# Patient Record
(demographics unavailable — no encounter records)

---

## 2024-10-15 NOTE — REASON FOR VISIT
[Symptom and Test Evaluation] : symptom and test evaluation [FreeTextEntry1] : 73M comes in for a re-evaluation. He completed an echocardiogram and a CCTA. We are discussing results. His blood pressure elevated on arrival.

## 2024-10-15 NOTE — DISCUSSION/SUMMARY
[FreeTextEntry1] : HTN cont same medications for now HLD mild given mild CAD, a statin can be considered, he wants to hold off for now CAD cont ASA

## 2024-11-06 NOTE — HISTORY OF PRESENT ILLNESS
[FreeTextEntry1] : Dear Dr. Huitron,  Thank you so much for the referral to help care for your patient.  Chief Complaint: Elevated PSA Date of first visit: 7/20/2023  CHRISTIN BETANCOURT is a 73 year old  man with PMHx HTN who presents for elevated PSA. His PSA has slowly been rising since 2017. It has ranged 2.6 ng/ml to most recently 4.46 ng/ml. MRI 7/26/23 was read as negative. He is biopsy naive. Does have a + family hx of CaP in his paternal uncle and his brother. Brother was diagnosed around age 60 when his PSA was >8 ng/ml and underwent brachytherapy. Unsure of [place] score. Brother is now 80 with no recurrence.  He is experiencing moderate LUTS including nocturia and urgency. His flow varies in strength. He is minimally bothered by his symptoms and not interested in alpha blocker.  PSA Hx 4.01 03/22/2024 4.46 7/5/23 3.81 6/30/22 2.73 3/23/21 2.73 2/7/20 2.88 11/9/18 2.63 4/6/17  MRI Hx: MRI read 07/26/2023 at [facility]. 47.6 cc prostate with PIRADS 2 no MRI targetable lesions. No LAD No EPE, No Bony Lesions. The images have been reviewed and clinical implications discussed with the patient. On review, image #15 left base pzpm area to watch  11/06/2024 IPSS 17   QOL 4 JACOB  PVR 64 cc   09/11/2023 IPSS QOL - questionnaire not provided JACOB 5 -NSA  07/20/2023 IPSS 10 QOL 2 IIEF-5 score - would prefer to not fill out but reports "no ED"  Prostate cancer screening: the patient and I spoke at length about prostate cancer screening, its risks and its benefits. The patient has 3 (age, FH, PSA) risk factors for prostate cancer. He understands that many men with prostate cancer will die with the disease rather than of it and we also discussed the results large multi-center American and  prostate cancer screening trials. He also understands that PSA in and of itself does not diagnose prostate cancer but only assesses risk to a certain degree. The patient understands that to definitively screen for prostate cancer, a biopsy is required and this procedure has risks, including bleeding, infection, ED and urinary retention. The patient opted to follow with PSA and imaging. He has declined [facility]  The patient denies fevers, chills, nausea and or vomiting and no unexplained weight loss.  All pertinent laboratory, films and physician notes were reviewed. Questionnaire results were discussed with patient.  I spent 31 minutes of non-face to face time reviewing the patient's records, chart, uploading processing MR images, transferring MR images from PACS to an independent workstation, reviewing images on independent workstation, speaking with their physician and planning their prostate biopsy and preparation of an upcoming visit for 09/11/2023 . The imaging and planning was highly complex and took this entire time.

## 2024-11-06 NOTE — ASSESSMENT
[FreeTextEntry1] : 74 y/o man with rising PSA over the years, most recently 4.46 ng/ml. MRI July 2023 read as negative, no prior biopsy. Does have a + family hx of CaP in his paternal uncle and brother. Negative AD. Moderate LUTS not on medication.  The prostate MRI has been reviewed with the patient (images and report). There are no suspicious lesions on 2nd look. Reviewing multiple studies, the probability of having prostate cancer with a negative prostate MRI is ~ 20%. However, the probability of having clinically significant disease is <5% of those positive. A recent study from [facility] presented level 1b evidence that a MRI can help avoid patient's biopsies and only misses ~ 3% of clinically significant disease.  I have discussed these details at length with the patient. He is aware of the pro's and con's of prostate cancer screening. Taking into account his PSA, Family History and AD findings. He wishes at this time to avoid a biopsy and will continue to undergo PSA based screening. If the PSA continues to increase or there is increasing clinical suspicion we will repeat MR imaging of the prostate prior to any intervention, due to risks associated with the prostate biopsy. The patient understands that a prostate biopsy is still the standard of care with regards to screening and MRI is an adjunct that can help localize and target more suspicious areas. In conclusion, he would like to hold off on the biopsy at this time.  elevated psa  - MRI at [facility] reviewed 9/11/23- image #15 left base pzpm area to watch. Reimage in 3-5 years, pending PSA trend ([id number]) - [facility] was offered and declined by patient - discussed psa may also be elevated due to voiding dysfunction - PSA q6 months, check today 11/6/24  BPH with LUTS - IPSS 17, recommended alfuzosin trial, rx sent   The patient understands that this medication may cause dizziness, retrograde ejaculation or nasal congestion among other complications. I recommended that the patient take this medication at night. If the side effects become too bothersome, the medication can be discontinued.   Follow up in 6 months  Thank you very much for allowing me to assist in the care of this patient. Please do not hesitate to contact me with any additional questions or concerns.     Sincerely,     Shan Brenner D.O. Professor of Urology and Radiology  of Urology at [facility] Director for Prostate Cancer [address] [place] [address], 5th Floor [facility], [address] Phone: [phone]   I was present with the Resident (Bayron Spann MD) during the irving portions of the history and exam. I discussed the case with the Resident and agree with the findings and plan as documented in the Resident/Fellow 's note, unless noted below.

## 2024-11-06 NOTE — ASSESSMENT
[FreeTextEntry1] : 72 y/o man with rising PSA over the years, most recently 4.46 ng/ml. MRI July 2023 read as negative, no prior biopsy. Does have a + family hx of CaP in his paternal uncle and brother. Negative AD. Moderate LUTS not on medication.  The prostate MRI has been reviewed with the patient (images and report). There are no suspicious lesions on 2nd look. Reviewing multiple studies, the probability of having prostate cancer with a negative prostate MRI is ~ 20%. However, the probability of having clinically significant disease is <5% of those positive. A recent study from [facility] presented level 1b evidence that a MRI can help avoid patient's biopsies and only misses ~ 3% of clinically significant disease.  I have discussed these details at length with the patient. He is aware of the pro's and con's of prostate cancer screening. Taking into account his PSA, Family History and AD findings. He wishes at this time to avoid a biopsy and will continue to undergo PSA based screening. If the PSA continues to increase or there is increasing clinical suspicion we will repeat MR imaging of the prostate prior to any intervention, due to risks associated with the prostate biopsy. The patient understands that a prostate biopsy is still the standard of care with regards to screening and MRI is an adjunct that can help localize and target more suspicious areas. In conclusion, he would like to hold off on the biopsy at this time.  elevated psa  - MRI at [facility] reviewed 9/11/23- image #15 left base pzpm area to watch. Reimage in 3-5 years, pending PSA trend ([id number]) - [facility] was offered and declined by patient - discussed psa may also be elevated due to voiding dysfunction - PSA q6 months, check today 11/6/24  BPH with LUTS - IPSS 17, recommended alfuzosin trial, rx sent   The patient understands that this medication may cause dizziness, retrograde ejaculation or nasal congestion among other complications. I recommended that the patient take this medication at night. If the side effects become too bothersome, the medication can be discontinued.   Follow up in 6 months  Thank you very much for allowing me to assist in the care of this patient. Please do not hesitate to contact me with any additional questions or concerns.     Sincerely,     Shan Brenner D.O. Professor of Urology and Radiology  of Urology at [facility] Director for Prostate Cancer [address] [place] [address], 5th Floor [facility], [address] Phone: [phone]   I was present with the Resident (Bayron Spann MD) during the irving portions of the history and exam. I discussed the case with the Resident and agree with the findings and plan as documented in the Resident/Fellow 's note, unless noted below.

## 2024-11-06 NOTE — HISTORY OF PRESENT ILLNESS
[FreeTextEntry1] : Dear Dr. Huitron,  Thank you so much for the referral to help care for your patient.  Chief Complaint: Elevated PSA Date of first visit: 7/20/2023  CHRISTIN BETANCOURT is a 73 year old  man with PMHx HTN who presents for elevated PSA. His PSA has slowly been rising since 2017. It has ranged 2.6 ng/ml to most recently 4.46 ng/ml. MRI 7/26/23 was read as negative. He is biopsy naive. Does have a + family hx of CaP in his paternal uncle and his brother. Brother was diagnosed around age 60 when his PSA was >8 ng/ml and underwent brachytherapy. Unsure of [place] score. Brother is now 80 with no recurrence.  He is experiencing moderate LUTS including nocturia and urgency. His flow varies in strength. He is minimally bothered by his symptoms and not interested in alpha blocker.  PSA Hx 4.01 03/22/2024 4.46 7/5/23 3.81 6/30/22 2.73 3/23/21 2.73 2/7/20 2.88 11/9/18 2.63 4/6/17  MRI Hx: MRI read 07/26/2023 at [facility]. 47.6 cc prostate with PIRADS 2 no MRI targetable lesions. No LAD No EPE, No Bony Lesions. The images have been reviewed and clinical implications discussed with the patient. On review, image #15 left base pzpm area to watch  11/06/2024 IPSS 17   QOL 4 JACOB  PVR 64 cc   09/11/2023 IPSS QOL - questionnaire not provided JACOB 5 -NSA  07/20/2023 IPSS 10 QOL 2 IIEF-5 score - would prefer to not fill out but reports "no ED"  Prostate cancer screening: the patient and I spoke at length about prostate cancer screening, its risks and its benefits. The patient has 3 (age, FH, PSA) risk factors for prostate cancer. He understands that many men with prostate cancer will die with the disease rather than of it and we also discussed the results large multi-center American and  prostate cancer screening trials. He also understands that PSA in and of itself does not diagnose prostate cancer but only assesses risk to a certain degree. The patient understands that to definitively screen for prostate cancer, a biopsy is required and this procedure has risks, including bleeding, infection, ED and urinary retention. The patient opted to follow with PSA and imaging. He has declined [facility]  The patient denies fevers, chills, nausea and or vomiting and no unexplained weight loss.  All pertinent laboratory, films and physician notes were reviewed. Questionnaire results were discussed with patient.  I spent 31 minutes of non-face to face time reviewing the patient's records, chart, uploading processing MR images, transferring MR images from PACS to an independent workstation, reviewing images on independent workstation, speaking with their physician and planning their prostate biopsy and preparation of an upcoming visit for 09/11/2023 . The imaging and planning was highly complex and took this entire time.

## 2025-04-22 NOTE — HISTORY OF PRESENT ILLNESS
[FreeTextEntry1] : Dear Dr. Huitron,  Thank you so much for the referral to help care for your patient.  Chief Complaint: Elevated PSA Date of first visit: 7/20/2023  CHRISTIN BETANCOURT is a 73 year old  man with PMHx HTN who presents for elevated PSA. His PSA has slowly been rising since 2017. It has ranged 2.6 ng/ml to most recently 4.46 ng/ml. MRI 7/26/23 was read as negative. He is biopsy naive. Does have a + family hx of CaP in his paternal uncle and his brother. Brother was diagnosed around age 60 when his PSA was >8 ng/ml and underwent brachytherapy. Unsure of [employer] score. Brother is now 80 with no recurrence.  He is experiencing moderate LUTS including nocturia and urgency. His flow varies in strength. He is minimally bothered by his symptoms and not interested in alpha blocker.  PSA Hx 4.27 11/06/2024 4.01 03/22/2024 4.46 7/5/23 3.81 6/30/22 2.73 3/23/21 2.73 2/7/20 2.88 11/9/18 2.63 4/6/17  MRI Hx: MRI read 07/26/2023 at [facility]. 47.6 cc prostate with PIRADS 2 no MRI targetable lesions. No LAD No EPE, No Bony Lesions. The images have been reviewed and clinical implications discussed with the patient. On review, image #15 left base pzpm area to watch  05/07/2025  IPSS  QOL JACOB  PVR  11/06/2024 IPSS 17 QOL 4 JACOB PVR 64 cc  09/11/2023 IPSS QOL - questionnaire not provided JACOB 5 -NSA  07/20/2023 IPSS 10 QOL 2 IIEF-5 score - would prefer to not fill out but reports "no ED"  Prostate cancer screening: the patient and I spoke at length about prostate cancer screening, its risks and its benefits. The patient has 3 (age, FH, PSA) risk factors for prostate cancer. He understands that many men with prostate cancer will die with the disease rather than of it and we also discussed the results large multi-center American and  prostate cancer screening trials. He also understands that PSA in and of itself does not diagnose prostate cancer but only assesses risk to a certain degree. The patient understands that to definitively screen for prostate cancer, a biopsy is required and this procedure has risks, including bleeding, infection, ED and urinary retention. The patient opted to follow with PSA and imaging. He has declined [facility]  The patient denies fevers, chills, nausea and or vomiting and no unexplained weight loss.  All pertinent laboratory, films and physician notes were reviewed. Questionnaire results were discussed with patient.  I spent 31 minutes of non-face to face time reviewing the patient's records, chart, uploading processing MR images, transferring MR images from PACS to an independent workstation, reviewing images on independent workstation, speaking with their physician and planning their prostate biopsy and preparation of an upcoming visit for 09/11/2023 . The imaging and planning was highly complex and took this entire time.

## 2025-04-22 NOTE — ASSESSMENT
[FreeTextEntry1] : 72 y/o man with rising PSA over the years, most recently 4.46 ng/ml. MRI July 2023 read as negative, no prior biopsy. Does have a + family hx of CaP in his paternal uncle and brother. Negative AD. Moderate LUTS not on medication.  The prostate MRI has been reviewed with the patient (images and report). There are no suspicious lesions on 2nd look. Reviewing multiple studies, the probability of having prostate cancer with a negative prostate MRI is ~ 20%. However, the probability of having clinically significant disease is <5% of those positive. A recent study from [facility] presented level 1b evidence that a MRI can help avoid patient's biopsies and only misses ~ 3% of clinically significant disease.  I have discussed these details at length with the patient. He is aware of the pro's and con's of prostate cancer screening. Taking into account his PSA, Family History and AD findings. He wishes at this time to avoid a biopsy and will continue to undergo PSA based screening. If the PSA continues to increase or there is increasing clinical suspicion we will repeat MR imaging of the prostate prior to any intervention, due to risks associated with the prostate biopsy. The patient understands that a prostate biopsy is still the standard of care with regards to screening and MRI is an adjunct that can help localize and target more suspicious areas. In conclusion, he would like to hold off on the biopsy at this time.  elevated psa - MRI at [facility] reviewed 9/11/23- image #15 left base pzpm area to watch. Reimage in 3-5 years, pending PSA trend ([id number]) - [facility] was offered and declined by patient - discussed psa may also be elevated due to voiding dysfunction - PSA q6 months, check today 11/6/24  BPH with LUTS - IPSS 17, recommended alfuzosin trial, rx sent  The patient understands that this medication may cause dizziness, retrograde ejaculation or nasal congestion among other complications. I recommended that the patient take this medication at night. If the side effects become too bothersome, the medication can be discontinued.  Follow up in 6 months  Thank you very much for allowing me to assist in the care of this patient. Please do not hesitate to contact me with any additional questions or concerns.   Sincerely,

## 2025-05-07 NOTE — HISTORY OF PRESENT ILLNESS
[FreeTextEntry1] : Dear Dr. Huitron,  Thank you so much for the referral to help care for your patient.  Chief Complaint: Elevated PSA Date of first visit: 7/20/2023  CHRISTIN BETANCOURT is a 73 year old  man with PMHx HTN who presents for elevated PSA. His PSA has slowly been rising since 2017. It has ranged 2.6 ng/ml to most recently 4.46 ng/ml. MRI 7/26/23 was read as negative. He is biopsy naive. Does have a + family hx of CaP in his paternal uncle and his brother. Brother was diagnosed around age 60 when his PSA was >8 ng/ml and underwent brachytherapy. Unsure of [place] score. Brother is now 80 with no recurrence.  He is experiencing moderate LUTS including nocturia and urgency. His flow varies in strength. He is minimally bothered by his symptoms, had alfuzosin trial but discontinued due to severe dizziness.   PSA Hx 4.27 11/06/2024, PSAD 0.08 ng/ml/cc  4.01 03/22/2024 4.46 7/5/23 3.81 6/30/22 2.73 3/23/21 2.73 2/7/20 2.88 11/9/18 2.63 4/6/17  MRI Hx: MRI read 07/26/2023 at [facility]. 47.6 cc prostate with PIRADS 2 no MRI targetable lesions. No LAD No EPE, No Bony Lesions. The images have been reviewed and clinical implications discussed with the patient. On review, image #15 left base pzpm area to watch  05/07/2025  IPSS 16 QOL 3 JACOB  PVR 53cc   11/06/2024 IPSS 17 QOL 4 JACOB PVR 64 cc  09/11/2023 IPSS QOL - questionnaire not provided JACOB 5 -NSA  07/20/2023 IPSS 10 QOL 2 IIEF-5 score - would prefer to not fill out but reports "no ED"  Prostate cancer screening: the patient and I spoke at length about prostate cancer screening, its risks and its benefits. The patient has 3 (age, FH, PSA) risk factors for prostate cancer. He understands that many men with prostate cancer will die with the disease rather than of it and we also discussed the results large multi-center American and  prostate cancer screening trials. He also understands that PSA in and of itself does not diagnose prostate cancer but only assesses risk to a certain degree. The patient understands that to definitively screen for prostate cancer, a biopsy is required and this procedure has risks, including bleeding, infection, ED and urinary retention. The patient opted to follow with PSA and imaging. He has declined [facility]  The patient denies fevers, chills, nausea and or vomiting and no unexplained weight loss.  All pertinent laboratory, films and physician notes were reviewed. Questionnaire results were discussed with patient.  I spent 31 minutes of non-face to face time reviewing the patient's records, chart, uploading processing MR images, transferring MR images from PACS to an independent workstation, reviewing images on independent workstation, speaking with their physician and planning their prostate biopsy and preparation of an upcoming visit for 09/11/2023 . The imaging and planning was highly complex and took this entire time.

## 2025-05-07 NOTE — ASSESSMENT
[FreeTextEntry1] : 74 y/o man with rising PSA over the years, most recently 4.46 ng/ml. MRI July 2023 read as negative, no prior biopsy. Does have a + family hx of CaP in his paternal uncle and brother. Negative AD. Moderate LUTS not on medication.  elevated psa - MRI at [facility] reviewed 9/11/23- image #15 left base pzpm area to watch. Reimage in 3-5 years, pending PSA trend ([id number]) - [facility] was offered and declined by patient / holding on biopsy at this time as per last note. - discussed psa may also be elevated due to voiding dysfunction - PSA q6 months, check 5/7/25  BPH with LUTS - IPSS 16, PVR 53 cc (5/7/25) tried alfuzosin however discontinued due to severe dizziness - discussed alternative BPH therapies including Rezum, Mini TURP vs Holep - states he is not too bothered by symptoms at this time  Follow up in 6 months with NP MG and 12 months with Dr Calzada   Thank you very much for allowing me to assist in the care of this patient. Please do not hesitate to contact me with any additional questions or concerns.  Sincerely,  Shan Brenner D.O. Professor of Urology and Radiology  of Urology at [facility] Director for Prostate Cancer [address], [address] [place] [address] Phone: [phone].

## 2025-05-07 NOTE — ASSESSMENT
[FreeTextEntry1] : 74 y/o man with rising PSA over the years, most recently 4.46 ng/ml. MRI July 2023 read as negative, no prior biopsy. Does have a + family hx of CaP in his paternal uncle and brother. Negative AD. Moderate LUTS not on medication.  elevated psa - MRI at [facility] reviewed 9/11/23- image #15 left base pzpm area to watch. Reimage in 3-5 years, pending PSA trend ([id number]) - [facility] was offered and declined by patient / holding on biopsy at this time as per last note. - discussed psa may also be elevated due to voiding dysfunction - PSA q6 months, check 5/7/25  BPH with LUTS - IPSS 16, PVR 53 cc (5/7/25) tried alfuzosin however discontinued due to severe dizziness - discussed alternative BPH therapies including Rezum, Mini TURP vs Holep - states he is not too bothered by symptoms at this time  Follow up in 6 months with NP MG and 12 months with Dr Calzada   Thank you very much for allowing me to assist in the care of this patient. Please do not hesitate to contact me with any additional questions or concerns.  Sincerely,  Shan Brenner D.O. Professor of Urology and Radiology  of Urology at [facility] Director for Prostate Cancer [address], [address] [place] [address] Phone: [phone].

## 2025-05-07 NOTE — ADDENDUM
[FreeTextEntry1] :   I, Dr. Brenner, personally performed the evaluation and management (E/M) services for this established patient who presents today with (a) new problem(s)/exacerbation of (an) existing condition(s).  That E/M includes conducting the examination, assessing all new/exacerbated conditions, and establishing a new plan of care.  Today, my ACP, Carolina Avitia, ANP-BC, was here to observe my evaluation and management services for this new problem/exacerbated condition to be followed going forward.

## 2025-05-07 NOTE — HISTORY OF PRESENT ILLNESS
[FreeTextEntry1] : Dear Dr. Huitron,  Thank you so much for the referral to help care for your patient.  Chief Complaint: Elevated PSA Date of first visit: 7/20/2023  CHRISTIN BETANCOURT is a 73 year old  man with PMHx HTN who presents for elevated PSA. His PSA has slowly been rising since 2017. It has ranged 2.6 ng/ml to most recently 4.46 ng/ml. MRI 7/26/23 was read as negative. He is biopsy naive. Does have a + family hx of CaP in his paternal uncle and his brother. Brother was diagnosed around age 60 when his PSA was >8 ng/ml and underwent brachytherapy. Unsure of [place] score. Brother is now 80 with no recurrence.  He is experiencing moderate LUTS including nocturia and urgency. His flow varies in strength. He is minimally bothered by his symptoms, had alfuzosin trial but discontinued due to severe dizziness.   PSA Hx 4.27 11/06/2024, PSAD 0.08 ng/ml/cc  4.01 03/22/2024 4.46 7/5/23 3.81 6/30/22 2.73 3/23/21 2.73 2/7/20 2.88 11/9/18 2.63 4/6/17  MRI Hx: MRI read 07/26/2023 at [facility]. 47.6 cc prostate with PIRADS 2 no MRI targetable lesions. No LAD No EPE, No Bony Lesions. The images have been reviewed and clinical implications discussed with the patient. On review, image #15 left base pzpm area to watch  05/07/2025  IPSS 16 QOL 3 JACOB  PVR 53cc   11/06/2024 IPSS 17 QOL 4 JACOB PVR 64 cc  09/11/2023 IPSS QOL - questionnaire not provided JACOB 5 -NSA  07/20/2023 IPSS 10 QOL 2 IIEF-5 score - would prefer to not fill out but reports "no ED"  Prostate cancer screening: the patient and I spoke at length about prostate cancer screening, its risks and its benefits. The patient has 3 (age, FH, PSA) risk factors for prostate cancer. He understands that many men with prostate cancer will die with the disease rather than of it and we also discussed the results large multi-center [place] and  prostate cancer screening trials. He also understands that PSA in and of itself does not diagnose prostate cancer but only assesses risk to a certain degree. The patient understands that to definitively screen for prostate cancer, a biopsy is required and this procedure has risks, including bleeding, infection, ED and urinary retention. The patient opted to follow with PSA and imaging. He has declined [facility]  The patient denies fevers, chills, nausea and or vomiting and no unexplained weight loss.  All pertinent laboratory, films and physician notes were reviewed. Questionnaire results were discussed with patient.  I spent 31 minutes of non-face to face time reviewing the patient's records, chart, uploading processing MR images, transferring MR images from PACS to an independent workstation, reviewing images on independent workstation, speaking with their physician and planning their prostate biopsy and preparation of an upcoming visit for 09/11/2023 . The imaging and planning was highly complex and took this entire time.

## 2025-07-10 NOTE — DISCUSSION/SUMMARY
[FreeTextEntry1] : Mild CAD cont ASA check echo if able to approve and schedule. HTN - CHRISTIN  and I had an extensive discussion regarding his blood pressure management. Patient will continue taking current medications in addition to maintaining a low Na diet, with periodic b/p checks at home. HLD follow up with PMD labs reviewed.  EKG section of the chart --- secondary to symptoms above an electrocardiogram also known as an EKG was performed.  Risks and benefits discussed with the patient. Patient was given time and privacy to changed into a gown. Shortly after, standard 10 leads were applied and a [employer] system was used to perform the study. The results were subsequently reviewed by attending physician and discussed with the patient. The study showed a normal sinus rhythm and no ST-T suggestive of ischemia. Order for the EKG was placed in the chart. The results were documented. Billing submitted.  [EKG obtained to assist in diagnosis and management of assessed problem(s)] : EKG obtained to assist in diagnosis and management of assessed problem(s)

## 2025-07-10 NOTE — REASON FOR VISIT
[Symptom and Test Evaluation] : symptom and test evaluation [FreeTextEntry1] : 74M comes in for a re-evaluation of mild CAD. No chest pain noted. He remains very active. No recent changes in medications.  No recent testing.

## 2025-07-29 NOTE — PHYSICAL EXAM
[No Acute Distress] : no acute distress [Well Nourished] : well nourished [Well Developed] : well developed [Well-Appearing] : well-appearing [Normal Sclera/Conjunctiva] : normal sclera/conjunctiva [PERRL] : pupils equal round and reactive to light [EOMI] : extraocular movements intact [Normal Outer Ear/Nose] : the outer ears and nose were normal in appearance [Normal Oropharynx] : the oropharynx was normal [No JVD] : no jugular venous distention [No Lymphadenopathy] : no lymphadenopathy [Supple] : supple [Thyroid Normal, No Nodules] : the thyroid was normal and there were no nodules present [No Respiratory Distress] : no respiratory distress  [No Accessory Muscle Use] : no accessory muscle use [Clear to Auscultation] : lungs were clear to auscultation bilaterally [Normal Rate] : normal rate  [Regular Rhythm] : with a regular rhythm [Normal S1, S2] : normal S1 and S2 [No Murmur] : no murmur heard [Normal] : normal rate, regular rhythm, normal S1 and S2 and no murmur heard [No Carotid Bruits] : no carotid bruits [No Edema] : there was no peripheral edema [Normal Appearance] : normal in appearance [Soft] : abdomen soft [Non Tender] : non-tender [Non-distended] : non-distended [No Masses] : no abdominal mass palpated [No HSM] : no HSM [Normal Bowel Sounds] : normal bowel sounds [No CVA Tenderness] : no CVA  tenderness [No Spinal Tenderness] : no spinal tenderness [No Joint Swelling] : no joint swelling [Grossly Normal Strength/Tone] : grossly normal strength/tone [No Rash] : no rash [Coordination Grossly Intact] : coordination grossly intact [No Focal Deficits] : no focal deficits [Normal Gait] : normal gait [Deep Tendon Reflexes (DTR)] : deep tendon reflexes were 2+ and symmetric [Normal Affect] : the affect was normal [Normal Insight/Judgement] : insight and judgment were intact [de-identified] : Slightly increased normal pitched bowel sounds.  No distention, tenderness, guarding, rebound, or masses.

## 2025-07-29 NOTE — HEALTH RISK ASSESSMENT
[Good] : ~his/her~  mood as  good [Yes] : Yes [Monthly or less (1 pt)] : Monthly or less (1 point) [1 or 2 (0 pts)] : 1 or 2 (0 points) [Never (0 pts)] : Never (0 points) [No] : In the past 12 months have you used drugs other than those required for medical reasons? No [No falls in past year] : Patient reported no falls in the past year [Little interest or pleasure doing things] : 1) Little interest or pleasure doing things [0] : 1) Little interest or pleasure doing things: Not at all (0) [Feeling down, depressed, or hopeless] : 2) Feeling down, depressed, or hopeless [1] : 2) Feeling down, depressed, or hopeless for several days (1) [PHQ-2 Negative - No further assessment needed] : PHQ-2 Negative - No further assessment needed [Audit-CScore] : 1 [[id number]] : 1 [Never] : Never [NO] : No [Patient reported colonoscopy was normal] : Patient reported colonoscopy was normal [HIV test declined] : HIV test declined [Hepatitis C test declined] : Hepatitis C test declined [Change in mental status noted] : No change in mental status noted [With Significant Other] : lives with significant other [Retired] : retired [Significant Other] : lives with significant other [Sexually Active] : sexually active [High Risk Behavior] : no high risk behavior [Fully functional (bathing, dressing, toileting, transferring, walking, feeding)] : Fully functional (bathing, dressing, toileting, transferring, walking, feeding) [Fully functional (using the telephone, shopping, preparing meals, housekeeping, doing laundry, using] : Fully functional and needs no help or supervision to perform IADLs (using the telephone, shopping, preparing meals, housekeeping, doing laundry, using transportation, managing medications and managing finances) [Reports changes in hearing] : Reports no changes in hearing [Reports changes in vision] : Reports no changes in vision [Reports normal functional visual acuity (ie: able to read med bottle)] : Reports normal functional visual acuity [Reports changes in dental health] : Reports no changes in dental health [Seat Belt] :  uses seat belt [Sunscreen] : uses sunscreen [TB Exposure] : is not being exposed to tuberculosis [ColonoscopyDate] : 01/2021 [Patient/Caregiver not ready to engage] : , patient/caregiver not ready to engage [AdvancecareDate] : 07/2024 [Discussed at today's visit] : Advance Directives Discussed at today's visit

## 2025-07-29 NOTE — HISTORY OF PRESENT ILLNESS
[FreeTextEntry1] : 74-year-old male, currently on treatment for mild HTN, now returns for CPE In the past year, no hospitalization, no surgery, no new medical conditions including COVID infection. [de-identified] : Patient wishes to discuss management of prior finding of pancreatic cyst. He now has hearing aids. Now followed by urology for elevated PSA.  Prostate MRI done 7/2023 consistent with BPH (PI RADS was 2).

## 2025-07-29 NOTE — ASSESSMENT
[FreeTextEntry1] : Health Maintenance His BMI is good and no weight loss is currently recommended. Daily aerobic exercises strongly recommended. No STD risk or substance abuse per patient report. Occasional gender specific self-examination is suggested. No depression. Competent with ADLs. Next colonoscopy in 2026 Patient states has completed Covid vaccine series with full complement of monovalent and polyvalent boosters.  He also had updated variant specific vaccine. Has also completed pneumococcal and shingles vaccination. PCV-20 administered in left arm today as definitive pneumococcal booster. Yearly high-dose flu vaccine is recommended in October  HTN Well-controlled on current regimen.  Pancreatic cyst. Patient has not pursued since last visit 1 year ago. No symptoms. States that prior work-up with pancreas specialist  in 2017 including MRI cholangiogram was considered to be low risk. In any case recommend follow-up and patient has been referred back to Dr. Kimbrough. Contact information provided. In addition, will send CA 19-9 today.  Skin cancer screening Referred to dermatology with contact information.  Elevated PSA Followed by urology Prostate cancer essentially ruled out at this time but patient advised to continue to have PSAs done every 6 months.

## 2025-07-29 NOTE — PHYSICAL EXAM
[No Acute Distress] : no acute distress [Well Nourished] : well nourished [Well Developed] : well developed [Well-Appearing] : well-appearing [Normal Sclera/Conjunctiva] : normal sclera/conjunctiva [PERRL] : pupils equal round and reactive to light [EOMI] : extraocular movements intact [Normal Outer Ear/Nose] : the outer ears and nose were normal in appearance [Normal Oropharynx] : the oropharynx was normal [No JVD] : no jugular venous distention [No Lymphadenopathy] : no lymphadenopathy [Supple] : supple [Thyroid Normal, No Nodules] : the thyroid was normal and there were no nodules present [No Respiratory Distress] : no respiratory distress  [No Accessory Muscle Use] : no accessory muscle use [Clear to Auscultation] : lungs were clear to auscultation bilaterally [Normal Rate] : normal rate  [Regular Rhythm] : with a regular rhythm [Normal S1, S2] : normal S1 and S2 [No Murmur] : no murmur heard [Normal] : normal rate, regular rhythm, normal S1 and S2 and no murmur heard [No Carotid Bruits] : no carotid bruits [No Edema] : there was no peripheral edema [Normal Appearance] : normal in appearance [Soft] : abdomen soft [Non Tender] : non-tender [Non-distended] : non-distended [No Masses] : no abdominal mass palpated [No HSM] : no HSM [Normal Bowel Sounds] : normal bowel sounds [No CVA Tenderness] : no CVA  tenderness [No Spinal Tenderness] : no spinal tenderness [No Joint Swelling] : no joint swelling [Grossly Normal Strength/Tone] : grossly normal strength/tone [No Rash] : no rash [Coordination Grossly Intact] : coordination grossly intact [No Focal Deficits] : no focal deficits [Normal Gait] : normal gait [Deep Tendon Reflexes (DTR)] : deep tendon reflexes were 2+ and symmetric [Normal Affect] : the affect was normal [Normal Insight/Judgement] : insight and judgment were intact [de-identified] : Slightly increased normal pitched bowel sounds.  No distention, tenderness, guarding, rebound, or masses.

## 2025-07-29 NOTE — HEALTH RISK ASSESSMENT
[Good] : ~his/her~  mood as  good [Yes] : Yes [Monthly or less (1 pt)] : Monthly or less (1 point) [1 or 2 (0 pts)] : 1 or 2 (0 points) [Never (0 pts)] : Never (0 points) [No] : In the past 12 months have you used drugs other than those required for medical reasons? No [No falls in past year] : Patient reported no falls in the past year [Little interest or pleasure doing things] : 1) Little interest or pleasure doing things [0] : 1) Little interest or pleasure doing things: Not at all (0) [Feeling down, depressed, or hopeless] : 2) Feeling down, depressed, or hopeless [1] : 2) Feeling down, depressed, or hopeless for several days (1) [PHQ-2 Negative - No further assessment needed] : PHQ-2 Negative - No further assessment needed [Audit-CScore] : 1 [GSV2Ctdor] : 1 [Never] : Never [NO] : No [Patient reported colonoscopy was normal] : Patient reported colonoscopy was normal [HIV test declined] : HIV test declined [Hepatitis C test declined] : Hepatitis C test declined [Change in mental status noted] : No change in mental status noted [With Significant Other] : lives with significant other [Retired] : retired [Significant Other] : lives with significant other [Sexually Active] : sexually active [High Risk Behavior] : no high risk behavior [Fully functional (bathing, dressing, toileting, transferring, walking, feeding)] : Fully functional (bathing, dressing, toileting, transferring, walking, feeding) [Fully functional (using the telephone, shopping, preparing meals, housekeeping, doing laundry, using] : Fully functional and needs no help or supervision to perform IADLs (using the telephone, shopping, preparing meals, housekeeping, doing laundry, using transportation, managing medications and managing finances) [Reports changes in hearing] : Reports no changes in hearing [Reports changes in vision] : Reports no changes in vision [Reports normal functional visual acuity (ie: able to read med bottle)] : Reports normal functional visual acuity [Reports changes in dental health] : Reports no changes in dental health [Seat Belt] :  uses seat belt [Sunscreen] : uses sunscreen [TB Exposure] : is not being exposed to tuberculosis [ColonoscopyDate] : 01/2021 [Patient/Caregiver not ready to engage] : , patient/caregiver not ready to engage [AdvancecareDate] : 07/2024 [Discussed at today's visit] : Advance Directives Discussed at today's visit

## 2025-07-29 NOTE — HISTORY OF PRESENT ILLNESS
[FreeTextEntry1] : 74-year-old male, currently on treatment for mild HTN, now returns for CPE In the past year, no hospitalization, no surgery, no new medical conditions including COVID infection. [de-identified] : Patient wishes to discuss management of prior finding of pancreatic cyst. He now has hearing aids. Now followed by urology for elevated PSA.  Prostate MRI done 7/2023 consistent with BPH (PI RADS was 2).